# Patient Record
Sex: MALE | Race: WHITE | NOT HISPANIC OR LATINO | URBAN - METROPOLITAN AREA
[De-identification: names, ages, dates, MRNs, and addresses within clinical notes are randomized per-mention and may not be internally consistent; named-entity substitution may affect disease eponyms.]

---

## 2019-09-29 ENCOUNTER — EMERGENCY (EMERGENCY)
Facility: HOSPITAL | Age: 29
LOS: 1 days | Discharge: HOME | End: 2019-09-29
Attending: EMERGENCY MEDICINE | Admitting: EMERGENCY MEDICINE
Payer: SELF-PAY

## 2019-09-29 VITALS
SYSTOLIC BLOOD PRESSURE: 137 MMHG | TEMPERATURE: 98 F | RESPIRATION RATE: 16 BRPM | DIASTOLIC BLOOD PRESSURE: 69 MMHG | HEART RATE: 77 BPM | OXYGEN SATURATION: 100 %

## 2019-09-29 PROCEDURE — 99053 MED SERV 10PM-8AM 24 HR FAC: CPT

## 2019-09-29 PROCEDURE — 99282 EMERGENCY DEPT VISIT SF MDM: CPT

## 2019-09-29 NOTE — ED ADULT TRIAGE NOTE - CHIEF COMPLAINT QUOTE
C/o right hand pain/swelling after hitting it on a cardboard box that contained frozen foods. Denies PMH

## 2019-09-30 RX ORDER — IBUPROFEN 200 MG
600 TABLET ORAL ONCE
Refills: 0 | Status: COMPLETED | OUTPATIENT
Start: 2019-09-30 | End: 2019-09-30

## 2019-09-30 RX ADMIN — Medication 600 MILLIGRAM(S): at 00:46

## 2019-09-30 NOTE — ED PROVIDER NOTE - PATIENT PORTAL LINK FT
You can access the FollowMyHealth Patient Portal offered by [facility] by registering at the following website: [email or web address] By joining [facility]’s FollowMyHealth portal, you will also be able to view your health information using other applications (apps) compatible with our system.

## 2019-09-30 NOTE — ED PROVIDER NOTE - NSFOLLOWUPINSTRUCTIONS_ED_ALL_ED_FT
As you are currently observing [place] holidays, you would prefer not to obtain X-Ray at this time.      Discussed risks and benefits, although currently low suspicion for fracture based on exam, please understand it is possible there might be a small fracture.  As per your preference, will discharge without the XR at this time.  Recommend getting follow up imaging in 2 days.     If symptoms worsen before then, please return to the emergency department immediately.

## 2019-09-30 NOTE — ED PROVIDER NOTE - CLINICAL SUMMARY MEDICAL DECISION MAKING FREE TEXT BOX
27 y/o male with no PMHx here for symptoms of temporary injury to ulnar nerve. Symptoms improving. R/O occult fracture. Will obtain for right wrist and hand.

## 2019-09-30 NOTE — ED PROVIDER NOTE - OBJECTIVE STATEMENT
27 y/o male with no pmhx presenting to ED with pain to R hand and wrist pain for 1 day. Around 8 o'clock this morning, pt jokingly "karate chopped" cardboard box with frozen tins in it, which was unknown to the pt. Reports increasing pain, throbbing and difficulty closing his hand so pt came to ED for evaluation. Since presenting to ED, pt states symptoms are improving. Denies numbness. No other acute complaints at time of eval.

## 2019-09-30 NOTE — ED PROVIDER NOTE - PROGRESS NOTE DETAILS
Camilo:  patient is currently observing [place] holidays, and would prefer not to obtain XR at this time.  Discussed risks and benefits, as low suspicion for fracture based on exam, will discharge without the XR at this time.  Recommend getting follow up imaging in 2 days.  If symptoms worsen before then, please return to the emergency department immediately.
